# Patient Record
Sex: FEMALE | Race: WHITE | NOT HISPANIC OR LATINO | URBAN - METROPOLITAN AREA
[De-identification: names, ages, dates, MRNs, and addresses within clinical notes are randomized per-mention and may not be internally consistent; named-entity substitution may affect disease eponyms.]

---

## 2017-01-24 ENCOUNTER — COMMUNICATION - HEALTHEAST (OUTPATIENT)
Dept: INTERNAL MEDICINE | Facility: CLINIC | Age: 52
End: 2017-01-24

## 2017-01-24 DIAGNOSIS — R11.0 NAUSEA: ICD-10-CM

## 2017-02-07 ENCOUNTER — COMMUNICATION - HEALTHEAST (OUTPATIENT)
Dept: INTERNAL MEDICINE | Facility: CLINIC | Age: 52
End: 2017-02-07

## 2017-02-08 ENCOUNTER — COMMUNICATION - HEALTHEAST (OUTPATIENT)
Dept: INTERNAL MEDICINE | Facility: CLINIC | Age: 52
End: 2017-02-08

## 2017-03-21 ENCOUNTER — COMMUNICATION - HEALTHEAST (OUTPATIENT)
Dept: INTERNAL MEDICINE | Facility: CLINIC | Age: 52
End: 2017-03-21

## 2017-03-21 DIAGNOSIS — F98.8 ADD (ATTENTION DEFICIT DISORDER): ICD-10-CM

## 2017-03-24 ENCOUNTER — COMMUNICATION - HEALTHEAST (OUTPATIENT)
Dept: INTERNAL MEDICINE | Facility: CLINIC | Age: 52
End: 2017-03-24

## 2017-04-26 ENCOUNTER — COMMUNICATION - HEALTHEAST (OUTPATIENT)
Dept: INTERNAL MEDICINE | Facility: CLINIC | Age: 52
End: 2017-04-26

## 2017-04-26 DIAGNOSIS — F98.8 ADD (ATTENTION DEFICIT DISORDER): ICD-10-CM

## 2017-04-27 ENCOUNTER — COMMUNICATION - HEALTHEAST (OUTPATIENT)
Dept: INTERNAL MEDICINE | Facility: CLINIC | Age: 52
End: 2017-04-27

## 2017-05-23 ENCOUNTER — COMMUNICATION - HEALTHEAST (OUTPATIENT)
Dept: INTERNAL MEDICINE | Facility: CLINIC | Age: 52
End: 2017-05-23

## 2017-05-23 DIAGNOSIS — F98.8 ADD (ATTENTION DEFICIT DISORDER): ICD-10-CM

## 2017-06-05 ENCOUNTER — RECORDS - HEALTHEAST (OUTPATIENT)
Dept: MAMMOGRAPHY | Facility: CLINIC | Age: 52
End: 2017-06-05

## 2017-06-05 DIAGNOSIS — Z12.31 ENCOUNTER FOR SCREENING MAMMOGRAM FOR MALIGNANT NEOPLASM OF BREAST: ICD-10-CM

## 2017-06-21 ENCOUNTER — OFFICE VISIT - HEALTHEAST (OUTPATIENT)
Dept: INTERNAL MEDICINE | Facility: CLINIC | Age: 52
End: 2017-06-21

## 2017-06-21 DIAGNOSIS — F98.8 ADD (ATTENTION DEFICIT DISORDER): ICD-10-CM

## 2017-06-21 DIAGNOSIS — Z00.00 HEALTH CARE MAINTENANCE: ICD-10-CM

## 2017-06-21 DIAGNOSIS — N92.0 HEAVY MENSES: ICD-10-CM

## 2017-06-21 DIAGNOSIS — E83.118 OTHER HEMOCHROMATOSIS: ICD-10-CM

## 2017-06-21 LAB
CHOLEST SERPL-MCNC: 221 MG/DL
FASTING STATUS PATIENT QL REPORTED: YES
HDLC SERPL-MCNC: 53 MG/DL
LDLC SERPL CALC-MCNC: 151 MG/DL
TRIGL SERPL-MCNC: 84 MG/DL

## 2017-06-21 ASSESSMENT — MIFFLIN-ST. JEOR: SCORE: 1231.79

## 2017-06-22 ENCOUNTER — HOSPITAL ENCOUNTER (OUTPATIENT)
Dept: ULTRASOUND IMAGING | Facility: CLINIC | Age: 52
Discharge: HOME | End: 2017-06-22
Attending: INTERNAL MEDICINE

## 2017-06-22 DIAGNOSIS — N92.0 HEAVY MENSES: ICD-10-CM

## 2017-06-23 ENCOUNTER — COMMUNICATION - HEALTHEAST (OUTPATIENT)
Dept: INTERNAL MEDICINE | Facility: CLINIC | Age: 52
End: 2017-06-23

## 2017-06-23 DIAGNOSIS — N92.0 MENORRHAGIA: ICD-10-CM

## 2017-08-01 ENCOUNTER — COMMUNICATION - HEALTHEAST (OUTPATIENT)
Dept: INTERNAL MEDICINE | Facility: CLINIC | Age: 52
End: 2017-08-01

## 2017-08-07 ENCOUNTER — RECORDS - HEALTHEAST (OUTPATIENT)
Dept: ADMINISTRATIVE | Facility: OTHER | Age: 52
End: 2017-08-07

## 2017-08-08 ENCOUNTER — COMMUNICATION - HEALTHEAST (OUTPATIENT)
Dept: INTERNAL MEDICINE | Facility: CLINIC | Age: 52
End: 2017-08-08

## 2017-08-08 DIAGNOSIS — F98.8 ADD (ATTENTION DEFICIT DISORDER): ICD-10-CM

## 2017-09-15 ENCOUNTER — COMMUNICATION - HEALTHEAST (OUTPATIENT)
Dept: INTERNAL MEDICINE | Facility: CLINIC | Age: 52
End: 2017-09-15

## 2017-09-15 ENCOUNTER — RECORDS - HEALTHEAST (OUTPATIENT)
Dept: ADMINISTRATIVE | Facility: OTHER | Age: 52
End: 2017-09-15

## 2017-09-15 DIAGNOSIS — F98.8 ADD (ATTENTION DEFICIT DISORDER): ICD-10-CM

## 2017-10-08 ENCOUNTER — OFFICE VISIT - HEALTHEAST (OUTPATIENT)
Dept: FAMILY MEDICINE | Facility: CLINIC | Age: 52
End: 2017-10-08

## 2017-10-08 ENCOUNTER — COMMUNICATION - HEALTHEAST (OUTPATIENT)
Dept: INTERNAL MEDICINE | Facility: CLINIC | Age: 52
End: 2017-10-08

## 2017-10-08 DIAGNOSIS — N30.90 CYSTITIS: ICD-10-CM

## 2017-10-26 ENCOUNTER — COMMUNICATION - HEALTHEAST (OUTPATIENT)
Dept: INTERNAL MEDICINE | Facility: CLINIC | Age: 52
End: 2017-10-26

## 2017-10-26 DIAGNOSIS — F98.8 ADD (ATTENTION DEFICIT DISORDER): ICD-10-CM

## 2017-11-01 ENCOUNTER — COMMUNICATION - HEALTHEAST (OUTPATIENT)
Dept: INTERNAL MEDICINE | Facility: CLINIC | Age: 52
End: 2017-11-01

## 2017-11-01 DIAGNOSIS — F98.8 ADD (ATTENTION DEFICIT DISORDER): ICD-10-CM

## 2017-12-06 ENCOUNTER — COMMUNICATION - HEALTHEAST (OUTPATIENT)
Dept: INTERNAL MEDICINE | Facility: CLINIC | Age: 52
End: 2017-12-06

## 2017-12-06 DIAGNOSIS — F98.8 ADD (ATTENTION DEFICIT DISORDER): ICD-10-CM

## 2017-12-12 ENCOUNTER — COMMUNICATION - HEALTHEAST (OUTPATIENT)
Dept: INTERNAL MEDICINE | Facility: CLINIC | Age: 52
End: 2017-12-12

## 2017-12-12 DIAGNOSIS — F98.8 ADD (ATTENTION DEFICIT DISORDER): ICD-10-CM

## 2018-01-13 ENCOUNTER — COMMUNICATION - HEALTHEAST (OUTPATIENT)
Dept: INTERNAL MEDICINE | Facility: CLINIC | Age: 53
End: 2018-01-13

## 2018-01-13 DIAGNOSIS — F98.8 ADD (ATTENTION DEFICIT DISORDER): ICD-10-CM

## 2018-01-13 DIAGNOSIS — R11.0 NAUSEA: ICD-10-CM

## 2018-01-22 ENCOUNTER — COMMUNICATION - HEALTHEAST (OUTPATIENT)
Dept: INTERNAL MEDICINE | Facility: CLINIC | Age: 53
End: 2018-01-22

## 2018-03-08 ENCOUNTER — COMMUNICATION - HEALTHEAST (OUTPATIENT)
Dept: INTERNAL MEDICINE | Facility: CLINIC | Age: 53
End: 2018-03-08

## 2018-03-08 DIAGNOSIS — F98.8 ADD (ATTENTION DEFICIT DISORDER): ICD-10-CM

## 2018-04-21 ENCOUNTER — COMMUNICATION - HEALTHEAST (OUTPATIENT)
Dept: INTERNAL MEDICINE | Facility: CLINIC | Age: 53
End: 2018-04-21

## 2018-04-21 DIAGNOSIS — F98.8 ADD (ATTENTION DEFICIT DISORDER): ICD-10-CM

## 2018-05-25 ENCOUNTER — COMMUNICATION - HEALTHEAST (OUTPATIENT)
Dept: INTERNAL MEDICINE | Facility: CLINIC | Age: 53
End: 2018-05-25

## 2018-05-25 DIAGNOSIS — F98.8 ADD (ATTENTION DEFICIT DISORDER): ICD-10-CM

## 2018-05-30 ENCOUNTER — COMMUNICATION - HEALTHEAST (OUTPATIENT)
Dept: INTERNAL MEDICINE | Facility: CLINIC | Age: 53
End: 2018-05-30

## 2018-05-30 DIAGNOSIS — F98.8 ADD (ATTENTION DEFICIT DISORDER): ICD-10-CM

## 2018-07-15 ENCOUNTER — COMMUNICATION - HEALTHEAST (OUTPATIENT)
Dept: INTERNAL MEDICINE | Facility: CLINIC | Age: 53
End: 2018-07-15

## 2018-07-15 DIAGNOSIS — F98.8 ADD (ATTENTION DEFICIT DISORDER): ICD-10-CM

## 2018-08-25 ENCOUNTER — COMMUNICATION - HEALTHEAST (OUTPATIENT)
Dept: INTERNAL MEDICINE | Facility: CLINIC | Age: 53
End: 2018-08-25

## 2018-08-25 DIAGNOSIS — F98.8 ADD (ATTENTION DEFICIT DISORDER): ICD-10-CM

## 2018-10-03 ENCOUNTER — OFFICE VISIT - HEALTHEAST (OUTPATIENT)
Dept: INTERNAL MEDICINE | Facility: CLINIC | Age: 53
End: 2018-10-03

## 2018-10-03 DIAGNOSIS — F98.8 ATTENTION DEFICIT DISORDER, UNSPECIFIED HYPERACTIVITY PRESENCE: ICD-10-CM

## 2018-10-03 DIAGNOSIS — Z00.00 HEALTH CARE MAINTENANCE: ICD-10-CM

## 2018-10-03 DIAGNOSIS — E83.118 OTHER HEMOCHROMATOSIS: ICD-10-CM

## 2018-10-03 LAB
ALBUMIN SERPL-MCNC: 4.1 G/DL (ref 3.5–5)
ALP SERPL-CCNC: 82 U/L (ref 45–120)
ALT SERPL W P-5'-P-CCNC: 19 U/L (ref 0–45)
ANION GAP SERPL CALCULATED.3IONS-SCNC: 11 MMOL/L (ref 5–18)
AST SERPL W P-5'-P-CCNC: 17 U/L (ref 0–40)
BILIRUB DIRECT SERPL-MCNC: 0.2 MG/DL
BILIRUB SERPL-MCNC: 0.6 MG/DL (ref 0–1)
BUN SERPL-MCNC: 12 MG/DL (ref 8–22)
CALCIUM SERPL-MCNC: 9.4 MG/DL (ref 8.5–10.5)
CHLORIDE BLD-SCNC: 102 MMOL/L (ref 98–107)
CHOLEST SERPL-MCNC: 195 MG/DL
CO2 SERPL-SCNC: 26 MMOL/L (ref 22–31)
CREAT SERPL-MCNC: 0.59 MG/DL (ref 0.6–1.1)
ERYTHROCYTE [DISTWIDTH] IN BLOOD BY AUTOMATED COUNT: 12 % (ref 11–14.5)
FASTING STATUS PATIENT QL REPORTED: YES
FERRITIN SERPL-MCNC: 39 NG/ML (ref 10–130)
GFR SERPL CREATININE-BSD FRML MDRD: >60 ML/MIN/1.73M2
GLUCOSE BLD-MCNC: 81 MG/DL (ref 70–125)
HCT VFR BLD AUTO: 40.3 % (ref 35–47)
HDLC SERPL-MCNC: 64 MG/DL
HGB BLD-MCNC: 13.5 G/DL (ref 12–16)
IRON SERPL-MCNC: 83 UG/DL (ref 42–175)
LDLC SERPL CALC-MCNC: 125 MG/DL
MCH RBC QN AUTO: 32.8 PG (ref 27–34)
MCHC RBC AUTO-ENTMCNC: 33.5 G/DL (ref 32–36)
MCV RBC AUTO: 98 FL (ref 80–100)
PLATELET # BLD AUTO: 214 THOU/UL (ref 140–440)
PMV BLD AUTO: 10.5 FL (ref 8.5–12.5)
POTASSIUM BLD-SCNC: 4.3 MMOL/L (ref 3.5–5)
PROT SERPL-MCNC: 6.8 G/DL (ref 6–8)
RBC # BLD AUTO: 4.11 MILL/UL (ref 3.8–5.4)
SODIUM SERPL-SCNC: 139 MMOL/L (ref 136–145)
TRIGL SERPL-MCNC: 32 MG/DL
TSH SERPL DL<=0.005 MIU/L-ACNC: 2.04 UIU/ML (ref 0.3–5)
WBC: 5 THOU/UL (ref 4–11)

## 2018-10-03 ASSESSMENT — MIFFLIN-ST. JEOR: SCORE: 1237.46

## 2018-10-04 LAB
HPV SOURCE: NORMAL
HUMAN PAPILLOMA VIRUS 16 DNA: NEGATIVE
HUMAN PAPILLOMA VIRUS 18 DNA: NEGATIVE
HUMAN PAPILLOMA VIRUS FINAL DIAGNOSIS: NORMAL
HUMAN PAPILLOMA VIRUS OTHER HR: NEGATIVE
SPECIMEN DESCRIPTION: NORMAL

## 2018-10-08 ENCOUNTER — OFFICE VISIT - HEALTHEAST (OUTPATIENT)
Dept: PHARMACY | Facility: CLINIC | Age: 53
End: 2018-10-08

## 2018-10-08 ENCOUNTER — RECORDS - HEALTHEAST (OUTPATIENT)
Dept: MAMMOGRAPHY | Facility: CLINIC | Age: 53
End: 2018-10-08

## 2018-10-08 DIAGNOSIS — F98.8 ATTENTION DEFICIT DISORDER, UNSPECIFIED HYPERACTIVITY PRESENCE: ICD-10-CM

## 2018-10-08 DIAGNOSIS — Z00.00 HEALTH CARE MAINTENANCE: ICD-10-CM

## 2018-10-08 DIAGNOSIS — Z12.31 ENCOUNTER FOR SCREENING MAMMOGRAM FOR MALIGNANT NEOPLASM OF BREAST: ICD-10-CM

## 2018-10-08 DIAGNOSIS — E83.118 OTHER HEMOCHROMATOSIS: ICD-10-CM

## 2018-10-23 ENCOUNTER — COMMUNICATION - HEALTHEAST (OUTPATIENT)
Dept: INTERNAL MEDICINE | Facility: CLINIC | Age: 53
End: 2018-10-23

## 2018-10-23 DIAGNOSIS — N92.1 MENORRHAGIA WITH IRREGULAR CYCLE: ICD-10-CM

## 2018-10-26 ENCOUNTER — HOSPITAL ENCOUNTER (OUTPATIENT)
Dept: ULTRASOUND IMAGING | Facility: CLINIC | Age: 53
Discharge: HOME | End: 2018-10-26
Attending: INTERNAL MEDICINE

## 2018-10-26 DIAGNOSIS — N92.1 MENORRHAGIA WITH IRREGULAR CYCLE: ICD-10-CM

## 2018-11-14 ENCOUNTER — COMMUNICATION - HEALTHEAST (OUTPATIENT)
Dept: NURSING | Facility: CLINIC | Age: 53
End: 2018-11-14

## 2018-11-19 ENCOUNTER — COMMUNICATION - HEALTHEAST (OUTPATIENT)
Dept: PHARMACY | Facility: CLINIC | Age: 53
End: 2018-11-19

## 2018-11-19 DIAGNOSIS — F98.8 ATTENTION DEFICIT DISORDER, UNSPECIFIED HYPERACTIVITY PRESENCE: ICD-10-CM

## 2019-01-02 ENCOUNTER — COMMUNICATION - HEALTHEAST (OUTPATIENT)
Dept: INTERNAL MEDICINE | Facility: CLINIC | Age: 54
End: 2019-01-02

## 2019-01-02 DIAGNOSIS — F98.8 ATTENTION DEFICIT DISORDER, UNSPECIFIED HYPERACTIVITY PRESENCE: ICD-10-CM

## 2019-02-03 ENCOUNTER — COMMUNICATION - HEALTHEAST (OUTPATIENT)
Dept: NURSING | Facility: CLINIC | Age: 54
End: 2019-02-03

## 2019-02-03 DIAGNOSIS — N95.1 PERIMENOPAUSE: ICD-10-CM

## 2019-02-03 DIAGNOSIS — N92.1 BREAKTHROUGH BLEEDING: ICD-10-CM

## 2019-02-03 DIAGNOSIS — R11.0 NAUSEA: ICD-10-CM

## 2019-02-05 RX ORDER — DROSPIRENONE AND ETHINYL ESTRADIOL 0.02-3(28)
1 KIT ORAL DAILY
Qty: 28 TABLET | Refills: 1 | Status: SHIPPED | OUTPATIENT
Start: 2019-02-05

## 2019-02-21 ENCOUNTER — COMMUNICATION - HEALTHEAST (OUTPATIENT)
Dept: INTERNAL MEDICINE | Facility: CLINIC | Age: 54
End: 2019-02-21

## 2019-02-21 DIAGNOSIS — F98.8 ATTENTION DEFICIT DISORDER, UNSPECIFIED HYPERACTIVITY PRESENCE: ICD-10-CM

## 2019-04-03 ENCOUNTER — COMMUNICATION - HEALTHEAST (OUTPATIENT)
Dept: INTERNAL MEDICINE | Facility: CLINIC | Age: 54
End: 2019-04-03

## 2019-04-03 DIAGNOSIS — F98.8 ATTENTION DEFICIT DISORDER, UNSPECIFIED HYPERACTIVITY PRESENCE: ICD-10-CM

## 2019-05-24 ENCOUNTER — COMMUNICATION - HEALTHEAST (OUTPATIENT)
Dept: INTERNAL MEDICINE | Facility: CLINIC | Age: 54
End: 2019-05-24

## 2019-05-24 DIAGNOSIS — F98.8 ATTENTION DEFICIT DISORDER, UNSPECIFIED HYPERACTIVITY PRESENCE: ICD-10-CM

## 2019-05-28 RX ORDER — DEXTROAMPHETAMINE SACCHARATE, AMPHETAMINE ASPARTATE MONOHYDRATE, DEXTROAMPHETAMINE SULFATE AND AMPHETAMINE SULFATE 5; 5; 5; 5 MG/1; MG/1; MG/1; MG/1
20 CAPSULE, EXTENDED RELEASE ORAL EVERY MORNING
Qty: 30 CAPSULE | Refills: 0 | Status: SHIPPED | OUTPATIENT
Start: 2019-05-28

## 2020-10-01 ENCOUNTER — RECORDS - HEALTHEAST (OUTPATIENT)
Dept: ADMINISTRATIVE | Facility: OTHER | Age: 55
End: 2020-10-01

## 2021-05-26 ENCOUNTER — RECORDS - HEALTHEAST (OUTPATIENT)
Dept: ADMINISTRATIVE | Facility: CLINIC | Age: 56
End: 2021-05-26

## 2021-05-27 ENCOUNTER — RECORDS - HEALTHEAST (OUTPATIENT)
Dept: ADMINISTRATIVE | Facility: CLINIC | Age: 56
End: 2021-05-27

## 2021-05-27 NOTE — TELEPHONE ENCOUNTER
Former patient of Anitha & has not established care with another provider.  Please assign refill request to covering provider per Clinic standard process.      Controlled Substance Refill Request  Medication:   Requested Prescriptions     Pending Prescriptions Disp Refills     dextroamphetamine-amphetamine (ADDERALL XR) 20 MG 24 hr capsule 30 capsule 0     Sig: Take 1 capsule (20 mg total) by mouth every morning.     Date Last Fill: 2/25/19  Pharmacy: [id number]   Submit electronically to pharmacy  Controlled Substance Agreement on File:   Encounter-Level CSA Scan Date - 06/21/2017:    Scan on 6/23/2017  2:44 PM (below)         Last office visit: Last office visit pertaining to requested medication was 10/3/18.

## 2021-05-28 ENCOUNTER — RECORDS - HEALTHEAST (OUTPATIENT)
Dept: ADMINISTRATIVE | Facility: CLINIC | Age: 56
End: 2021-05-28

## 2021-05-29 ENCOUNTER — RECORDS - HEALTHEAST (OUTPATIENT)
Dept: ADMINISTRATIVE | Facility: CLINIC | Age: 56
End: 2021-05-29

## 2021-05-29 NOTE — TELEPHONE ENCOUNTER
Controlled Substance Refill Request  Medication:   Requested Prescriptions     Pending Prescriptions Disp Refills     dextroamphetamine-amphetamine (ADDERALL XR) 20 MG 24 hr capsule 30 capsule 0     Sig: Take 1 capsule (20 mg total) by mouth every morning.     Date Last Fill: 4/4/19  [facility]    Submit electronically to pharmacy  Controlled Substance Agreement on File:   Encounter-Level CSA Scan Date - 06/21/2017:    Scan on 6/23/2017  2:44 PM (below)         Last office visit: Last office visit pertaining to requested medication was 10/3/18 .

## 2021-05-30 ENCOUNTER — RECORDS - HEALTHEAST (OUTPATIENT)
Dept: ADMINISTRATIVE | Facility: CLINIC | Age: 56
End: 2021-05-30

## 2021-05-31 ENCOUNTER — RECORDS - HEALTHEAST (OUTPATIENT)
Dept: ADMINISTRATIVE | Facility: CLINIC | Age: 56
End: 2021-05-31

## 2021-05-31 VITALS — HEIGHT: 64 IN | WEIGHT: 147 LBS | BODY MASS INDEX: 25.1 KG/M2

## 2021-06-01 VITALS — BODY MASS INDEX: 25.31 KG/M2 | HEIGHT: 64 IN | WEIGHT: 148.25 LBS

## 2021-06-01 VITALS — WEIGHT: 148 LBS | BODY MASS INDEX: 25.4 KG/M2

## 2021-06-11 NOTE — PROGRESS NOTES
"Chief complaint: Here for a physical    History of present illness:   Megan comes in today for a general physical.  She is feeling well but still is getting heavy menstrual periods that are fairly regular.  She will, about once to twice a year go on birth control pills just to regulate her menstrual cycle while on a vacation.  She has underlying hemochromatosis and her menstrual cycles have been away to avoid phlebotomies but at age 48 fourths getting old.  Her sister has a history of uterine cancer.    Social history:   Social History     Social History Narrative    She is . She is a  and works at [employer]. She has 3 children, one step daughter and some step-grandchildren.  She does not smoke cigarettes and occasionally drinks alcohol.       Family history:    Family History   Problem Relation Age of Onset     Other Father       of vasculitis in his 70s     Osteoporosis Mother      Uterine cancer Sister      Cervical cancer Sister      Colon polyps Sister      Colonic polyp Brother        Review of systems:   Please see above,  The rest of the review of systems are negative for all systems.    Current allergies, medications, and problem list are all reviewed and updated in the chart.    Physical exam:  Vitals:    17 0731   BP: 92/60   Patient Site: Left Arm   Patient Position: Sitting   Cuff Size: Adult Regular   Pulse: 80   Weight: 147 lb (66.7 kg)   Height: 5' 4\" (1.626 m)     Body mass index is 25.23 kg/(m^2).  General Appearance:  Alert, cooperative, no distress, appears stated age   Head:  Normocephalic, without obvious abnormality, atraumatic   Eyes:  PERRL, conjunctiva/corneas clear, EOM's intact   Ears:  Normal TM's and external ear canals, both ears   Nose: Nares normal, no drainage    Throat: Lips, mucosa, and tongue normal; teeth and gums normal   Neck: Supple, symmetrical, trachea midline, no adenopathy;  thyroid: not enlarged, symmetric, no tenderness/mass/nodules; " no carotid bruit   Back:   Symmetric, no curvature, ROM normal   Lungs:   Clear to auscultation bilaterally, respirations unlabored   Breasts:  No breast masses, tenderness, asymmetry, or nipple discharge.   Heart:  Regular rate and rhythm, S1 and S2 normal, no murmur, rub, or gallop   Abdomen:   Soft, non-tender, positive bowel sounds, no masses, no organomegaly   Extremities: Extremities normal, atraumatic, no cyanosis or edema   Skin: Skin color, texture, turgor normal, no rashes or lesions   Lymph nodes: Cervical, supraclavicular, and axillary nodes normal   Neurologic:  nonfocal with facial symmetry      Assessment and plan:  1. Health care maintenance  She had a colonoscopy in 2016, mammogram was done earlier this month, Pap smear was done 2015 and immunizations are up-to-date.  - Lipid Cascade    2. ADD (attention deficit disorder)  CSA is signed.  - dextroamphetamine-amphetamine (ADDERALL) 20 mg Tab; Take 20 mg by mouth daily.  Dispense: 30 tablet; Refill: 0    3. Heavy menses  We will proceed with an ultrasound, she may benefit from a uterine ablation.  By doing so, she may have to resume phlebotomies in the future.  - US Pelvis With Transvaginal Non OB; Future  - Basic Metabolic Panel  - HM2(CBC w/o Differential)  - Thyroid Stimulating Hormone (TSH)    4. Hemochromatosis  As above.  - Hepatic Profile  - Ferritin        Rose Welsh MD  Internal and Geriatric Medicine  [facility]  6/21/2017    Much or all of the text in this note was generated through the use of Dragon Dictate voice-to-text software. Errors in spelling or words which seem out of context are unintentional. Sound alike errors, in particular, may have escaped editing.

## 2021-06-13 NOTE — PROGRESS NOTES
Assessment:   The encounter diagnosis was Cystitis.     Plan:     Medications Ordered   Medications     sulfamethoxazole-trimethoprim (SEPTRA DS) 800-160 mg per tablet     Sig: Take 1 tablet by mouth 2 (two) times a day for 3 days.     Dispense:  6 tablet     Refill:  0     Patient Instructions     Based on the information that you have provided, I have placed an order for you to start treatment.  View your full visit summary for details. Click on the link below to access your visit summary.    Your pharmacist will address any questions you may have about taking the medication.  Push fluids, avoid caffeinated beverages as these are a bladder irritant  Recommend probiotics such as acidophillis and bifidus to replace the normal bacteria that lives in the colon and gets depleted by antibiotics. You can buy it as an over the counter supplement (Culturelle, Florajen) or eat yogurt with live or active cultures.  Monitor symptoms, if not improving or you begin to have fever, chills, nausea, vomiting, or flank pain, come in to the clinic for further evaluation.    Return for further follow up if needed. Call [phone]) or schedule an appointment via [employer]..    Subjective:   Megan Felipe is a 52 y.o. female who submitted an eVisit request for evaluation of her Dysuria.  See the questionnaire and message section of encounter report for information related to history of present illness and review of systems.    The following portions of the patient's history were reviewed and updated as appropriate:  She  does not have any pertinent problems on file.  She has No Known Allergies..     Objective:   No exam performed today, patient submitted as eVisit.

## 2021-06-16 NOTE — TELEPHONE ENCOUNTER
Telephone Encounter by Maribell Colbert CMA at 4/4/2019 10:44 AM     Author: Maribell Colbert CMA Service: -- Author Type: Certified Medical Assistant    Filed: 4/4/2019 11:00 AM Encounter Date: 4/3/2019 Status: Signed    : Maribell Colbert CMA (Certified Medical Assistant)       Medication: Adderall  Last Date Filled 2/25/19   pulled: YES    Only PCP Prescribing? : YES  Taken as prescribed from physician notes? YES    CSA in last year: YES  Random Utox in last year: NO  (if any of the above answer NO - schedule with PCP)     Opioids + benzodiazepines? NO  (if the above answer YES - schedule with PCP every 6 months)     All responses suggest: Scheduling with PCP for further intervention to est care

## 2021-06-16 NOTE — TELEPHONE ENCOUNTER
Telephone Encounter by Alisha Alvarez CMA at 5/28/2019  3:39 PM     Author: Alisha Alvarez CMA Service: -- Author Type: Certified Medical Assistant    Filed: 5/28/2019  3:44 PM Encounter Date: 5/24/2019 Status: Signed    : Alisha Alvarez CMA (Certified Medical Assistant)       Medication: Adderall  Last Date Filled 4/4/2019 for 30 tablets   pulled: YES     Only PCP Prescribing? : YES  Taken as prescribed from physician notes? YES- 10/8/2018- 1. Attention deficit disorder, unspecified hyperactivity presence  Stable, discussed benefits versus risks of treating ADD, and at this time she is benefiting from improved to focus at work, however is able to take days off on the weekends and over the summer without negatively impacting her quality of life.  Discussed that data is stronger with the stimulant medications for adults with ADD as opposed to non-stimulants such as Strattera, or antidepressants such as venlafaxine or bupropion.  Discussed that alternate formulation such as the extended release formulation of Adderall may prevent afternoon crashes, however she is tolerating her current regimen without adverse effects, and will continue this at this time.  She has signed a controlled substance agreement, because this is not opioid will not have her do a urine drug screen.  She does plan to establish care with Dr. Pena in follow-up after Dr. Welsh's departure.  Discussed long-term risks with stimulant medications, which would largely be pertaining to individuals with cardiac history or comorbidities increasing the risk of such. Therefore recommend continue current regimen at this time.      10/3/2018- 3. Attention deficit disorder, unspecified hyperactivity presence  Discussed safe use of this, she is interested in perhaps alternatives and will set her up to see our Pharm.D.  CSA is signed.  - dextroamphetamine-amphetamine (ADDERALL) 20 mg Tab; Take 20 mg by mouth daily.  Dispense: 30 tablet; Refill: 0  -  Ambulatory referral to Medication Management      CSA in last year: YES- 10/3/2018  Random Utox in last year: Not needed    Opioids + benzodiazepines? NO    Is patient on the Executive Review Team? NO      All responses suggest: Scheduling with PCP for further intervention

## 2021-06-16 NOTE — TELEPHONE ENCOUNTER
Telephone Encounter by Karen Szymanski LPN at 1/3/2019  9:26 AM     Author: Karen Szymanski LPN Service: -- Author Type: Licensed Nurse    Filed: 1/3/2019 10:04 AM Encounter Date: 1/2/2019 Status: Signed    : Karen Szymanski LPN (Licensed Nurse)       Medication: Adderall 20 mg  Last Date Filled 11/19/18   pulled: YES       Only PCP Prescribing? : YES  Taken as prescribed from physician notes? YES OV 10/8/18  1. Attention deficit disorder, unspecified hyperactivity presence  Stable, discussed benefits versus risks of treating ADD, and at this time she is benefiting from improved to focus at work, however is able to take days off on the weekends and over the summer without negatively impacting her quality of life.  Discussed that data is stronger with the stimulant medications for adults with ADD as opposed to non-stimulants such as Strattera, or antidepressants such as venlafaxine or bupropion.  Discussed that alternate formulation such as the extended release formulation of Adderall may prevent afternoon crashes, however she is tolerating her current regimen without adverse effects, and will continue this at this time.  She has signed a controlled substance agreement, because this is not opioid will not have her do a urine drug screen.  She does plan to establish care with Dr. Pena in follow-up after Dr. Welsh's departure.  Discussed long-term risks with stimulant medications, which would largely be pertaining to individuals with cardiac history or comorbidities increasing the risk of such.  Therefore recommend continue current regimen at this time.       CSA in last year: YES  Random Utox in last year: NO  (if any of the above answer NO - schedule with PCP)     Opioids + benzodiazepines? NO  (if the above answer YES - schedule with PCP every 6 months)     All responses suggest: Scheduling with PCP for further intervention

## 2021-06-20 NOTE — PROGRESS NOTES
MTM Initial Encounter  Assessment & Plan                                                     1. Attention deficit disorder, unspecified hyperactivity presence  Stable, discussed benefits versus risks of treating ADD, and at this time she is benefiting from improved to focus at work, however is able to take days off on the weekends and over the summer without negatively impacting her quality of life.  Discussed that data is stronger with the stimulant medications for adults with ADD as opposed to non-stimulants such as Strattera, or antidepressants such as venlafaxine or bupropion.  Discussed that alternate formulation such as the extended release formulation of Adderall may prevent afternoon crashes, however she is tolerating her current regimen without adverse effects, and will continue this at this time.  She has signed a controlled substance agreement, because this is not opioid will not have her do a urine drug screen.  She does plan to establish care with Dr. Pena in follow-up after Dr. Welsh's departure.  Discussed long-term risks with stimulant medications, which would largely be pertaining to individuals with cardiac history or comorbidities increasing the risk of such.  Therefore recommend continue current regimen at this time.    2. Hemochromatosis  Stable. Recommended to continue current regimen.     3. Health care maintenance  Stable, on adequate calcium and vitamin D for bone health.  Discussed weightbearing exercises which she also will try to incorporate.  She will call her insurance to see if a DEXA scan will be covered in the next year due to family history of osteoporosis.    Follow Up  Return in about 12 months (around 10/8/2019).      Subjective & Objective                                                     Megan Felipe is a 53 y.o. female coming in for an initial visit for Medication Therapy Management. She was referred to me from April ROSE Welsh MD    Chief Complaint: Medication  management    Medication Adherence/Access: Stable, no issues identified.    ADD: Notes that she was diagnosed after working with Dr. Welsh, during the process of diagnosing her son with ADD.  She was started on stimulants, Adderall, and has found that this is been very effective for her improving her symptoms of lack of focus.  Prior to this she had identified different ways of adapting with her difficulties and focus, however again estimates have been very effective for her.  She does notice around 2:58 PM that she does get a little bit tired, but never correlated this to her formulation of medication.  On weekends or during the summer when she is not working as a , she does intermittently not take this medication.  She was however taking classes this summer so she did use her Adderall more regularly.  She is wondering with the risks versus benefits of taking this medication are compared to other types of treatments for this condition.    Hemochromatosis: She was asymptomatic upon a diagnosis per patient report, and had a phlebotomy performed to treated.  This was actually identified when she tried to give blood on multiple occasions when that they had turned her away based on her labs.  Since then she had heavy periods which seemed to result in maintaining normal labs.  She now returns on an annual basis for monitoring.  She does believe that she is starting into menopause now at age of 53, and since she is not having heavy periods, realizes that she may require treatment again in the future.  Due to severe heavy.  She did see a gynecologist, who had suggested an ablation, however she has not had another heavy period, she would prefer to wait to see if they stop completely.  In the past when she was traveling she did take birth control pills to prevent bleeding, and this is very effective for her.  She did not correlate this with breakthrough bleeding when she stopped the tablets upon  returning home.  Denies any vasomotor symptoms.  Lab Results   Component Value Date    HGB 13.5 10/03/2018     Lab Results   Component Value Date    IRON 83 10/03/2018    FERRITIN 39 10/03/2018     Healthcare maintenance: Notes that her mother had osteoporosis, therefore she is taking calcium and vitamin D.  She will consider looking into her insurance to see if they will pay for a DEXA scan next year, or when she is officially into menopause.  Historically for travel she has required a scopolamine patch which was very effective for her when going on cruises.  For longer trips that she has taken a few tablets of lorazepam to allow her to tolerate the long flight better, and this is very effective.    PMH: reviewed in EPIC   Allergies/ADRs: reviewed in EPIC   Alcohol: reviewed in EPIC   Tobacco:   History   Smoking Status     Never Smoker   Smokeless Tobacco     Never Used     Today's Vitals:   Vitals:    10/08/18 1320   BP: 116/72   Pulse: 96   Weight: 148 lb (67.1 kg)     ----------------    Much or all of the text in this note was generated through the use of Dragon Dictate voice-to-text software. Errors in spelling or words which seem out of context are unintentional. Sound alike errors, in particular, may have escaped editing.    The patient was given a summary of these recommendations as an after visit summary    I spent 45 minutes with this patient today.   All changes were made via collaborative practice agreement with Rose Welsh MD. A copy of the visit note was provided to the patient's provider.     Dioni Shoemaker, PharmD, BCACP  Medication Management (MTM) Pharmacist  [facility]         Current Outpatient Prescriptions   Medication Sig Dispense Refill     calcium carbonate (OS-DENNIS) 600 mg calcium (1,500 mg) tablet Take 600 mg by mouth daily.       cholecalciferol, vitamin D3, 1,000 unit tablet Take 1,000 Units by mouth daily.       dextroamphetamine-amphetamine (ADDERALL) 20 mg Tab  Take 20 mg by mouth daily. 30 tablet 0     scopolamine (TRANSDERM-SCOP) 1.5 mg transdermal patch Place 1 patch on the skin every third day. 10 patch 2     No current facility-administered medications for this visit.

## 2021-06-20 NOTE — PROGRESS NOTES
"Chief complaint: Here for a physical    History of present illness:   Megan has a history of hemochromatosis, asymptomatic and was treated in the past by just her normal menstrual cycles.  Now that she has had menopause several months ago is wondering what to do in the future.    Social history:   Social History     Social History Narrative    She is . She is a  and works at [employer]. She has 3 children, one step daughter and some step-grandchildren.  She does not smoke cigarettes and occasionally drinks alcohol.       Family history:    Family History   Problem Relation Age of Onset     Other Father       of vasculitis in his 70s     Osteoporosis Mother      alive age 88     Uterine cancer Sister      Cervical cancer Sister      Colon polyps Sister      Colonic polyp Brother        Review of systems: The above.  The rest of the review of systems are negative for all systems.    Current allergies, medications, and problem list are all reviewed and updated in the chart.    Physical exam:  Vitals:    10/03/18 1227   BP: 116/72   Patient Site: Left Arm   Patient Position: Sitting   Cuff Size: Adult Regular   Pulse: 96   SpO2: 99%   Weight: 148 lb 4 oz (67.2 kg)   Height: 5' 4\" (1.626 m)     Body mass index is 25.45 kg/(m^2).  General Appearance:  Alert, cooperative, no distress, appears stated age   Head:  Normocephalic, without obvious abnormality, atraumatic   Eyes:  PERRL, conjunctiva/corneas clear, EOM's intact   Ears:  Normal TM's and external ear canals, both ears   Nose: Nares normal, no drainage    Throat: Lips, mucosa, and tongue normal; teeth and gums normal   Neck: Supple, symmetrical, trachea midline, no adenopathy;  thyroid: not enlarged, symmetric, no tenderness/mass/nodules; no carotid bruit   Back:   Symmetric, no curvature, ROM normal   Lungs:   Clear to auscultation bilaterally, respirations unlabored   Breasts:  No breast masses, tenderness, asymmetry, or nipple " discharge.   Heart:  Regular rate and rhythm, S1 and S2 normal, no murmur, rub, or gallop   Abdomen:   Soft, non-tender, bowel sounds active, no masses, no organomegaly   Genitalia: Normally developed genitalia with no external lesions or eruptions.  Vagina and cervix show no lesions, inflammation, discharge or tenderness.  No cystocele.  Uterus normal size and position.  No adnexal mass or tenderness.   Rectal:  No hemorrhoids   Extremities: Extremities normal, atraumatic, no cyanosis or edema   Skin: Skin color, texture, turgor normal, no rashes or lesions   Lymph nodes: Cervical, supraclavicular, and axillary nodes normal   Neurologic: Nonfocal with facial symmetry      Assessment and plan:  1. Health care maintenance  We discussed the shingles vaccine.  Mammogram was last year, would continue to recommend every 1-2 years.  Colonoscopy is due next year.  - Gynecologic Cytology (PAP Smear)    2. Hemochromatosis  Check labs, would recommend investigating if she can give blood on a regular basis to treat.  - Basic Metabolic Panel  - HM2(CBC w/o Differential)  - Thyroid Stimulating Hormone (TSH)  - Hepatic Profile  - Lipid Cascade  - Ferritin  - Iron    3. Attention deficit disorder, unspecified hyperactivity presence  Discussed safe use of this, she is interested in perhaps alternatives and will set her up to see our Pharm.D.  CSA is signed.  - dextroamphetamine-amphetamine (ADDERALL) 20 mg Tab; Take 20 mg by mouth daily.  Dispense: 30 tablet; Refill: 0  - Ambulatory referral to Medication Management            Rose Welsh MD  Internal and Geriatric Medicine  [facility]  10/3/2018    [facility]  Much or all of the text in this note was generated through the use of Dragon Dictate voice-to-text software. Errors in spelling or words which seem out of context are unintentional.  Sound alike errors, in particular, may have escaped editing.

## 2021-06-22 NOTE — TELEPHONE ENCOUNTER
Former patient of SUZETTE Welsh & has not established care with another provider.  Please assign refill request to covering provider per Clinic standard process.    Controlled Substance Refill Request  Medication:   Requested Prescriptions     Pending Prescriptions Disp Refills     dextroamphetamine-amphetamine (ADDERALL XR) 20 MG 24 hr capsule 30 capsule 0     Sig: Take 1 capsule (20 mg total) by mouth every morning.     Date Last Fill: 11/19/2018  [id number]   Submit electronically to pharmacy  Controlled Substance Agreement on File:   Encounter-Level CSA Scan Date - 06/21/2017:    Scan on 6/23/2017  2:44 PM (below)         Last office visit: 10/3/2018

## 2021-06-23 NOTE — TELEPHONE ENCOUNTER
"Per our last visit in October, \"She does believe that she is starting into menopause now at age of 53, and since she is not having heavy periods, realizes that she may require treatment again in the future.  Due to severe heavy.  She did see a gynecologist, who had suggested an ablation, however she has not had another heavy period, she would prefer to wait to see if they stop completely.  In the past when she was traveling she did take birth control pills to prevent bleeding, and this is very effective for her.  She did not correlate this with breakthrough bleeding when she stopped the tablets upon returning home.  Denies any vasomotor symptoms.\"     Discussed with Dr. Ye, as long as she is following up with gynecology with regards to women's health/menopause and to annual monitoring labs for hemochromatosis, it is reasonable and low risk to prescribe one packet of birth control for her trip.  We will also send in scopolamine patches for her as well.  "

## 2021-06-24 NOTE — TELEPHONE ENCOUNTER
Medication: Adderall 20mg  Last Date Filled 1/3/19   pulled: YES    Only PCP Prescribing? : NO  Taken as prescribed from physician notes? YES    CSA in last year: yes  Random Utox in last year: NO  (if any of the above answer NO - schedule with PCP)     Opioids + benzodiazepines? NO  (if the above answer YES - schedule with PCP every 6 months)     All responses suggest: Refilling prescription

## 2021-06-24 NOTE — TELEPHONE ENCOUNTER
Former patient of Radha & has not established care with another provider.  Please assign refill request to covering provider per Clinic standard process.      Controlled Substance Refill Request  Medication:   Requested Prescriptions     Pending Prescriptions Disp Refills     dextroamphetamine-amphetamine (ADDERALL XR) 20 MG 24 hr capsule 30 capsule 0     Sig: Take 1 capsule (20 mg total) by mouth every morning.     Date Last Fill: 1/3/19  Pharmacy: [phone]   Submit electronically to pharmacy  Controlled Substance Agreement on File:   Encounter-Level CSA Scan Date - 06/21/2017:    Scan on 6/23/2017  2:44 PM (below)         Last office visit: Last office visit pertaining to requested medication was 10/3/18.

## 2021-07-03 ENCOUNTER — HEALTH MAINTENANCE LETTER (OUTPATIENT)
Age: 56
End: 2021-07-03

## 2021-10-23 ENCOUNTER — HEALTH MAINTENANCE LETTER (OUTPATIENT)
Age: 56
End: 2021-10-23

## 2022-05-02 ENCOUNTER — TRANSFERRED RECORDS (OUTPATIENT)
Dept: HEALTH INFORMATION MANAGEMENT | Facility: CLINIC | Age: 57
End: 2022-05-02

## 2022-07-30 ENCOUNTER — HEALTH MAINTENANCE LETTER (OUTPATIENT)
Age: 57
End: 2022-07-30

## 2022-10-09 ENCOUNTER — HEALTH MAINTENANCE LETTER (OUTPATIENT)
Age: 57
End: 2022-10-09

## 2023-08-19 ENCOUNTER — HEALTH MAINTENANCE LETTER (OUTPATIENT)
Age: 58
End: 2023-08-19